# Patient Record
Sex: MALE | Race: BLACK OR AFRICAN AMERICAN | NOT HISPANIC OR LATINO | Employment: OTHER | URBAN - METROPOLITAN AREA
[De-identification: names, ages, dates, MRNs, and addresses within clinical notes are randomized per-mention and may not be internally consistent; named-entity substitution may affect disease eponyms.]

---

## 2020-11-11 DIAGNOSIS — Z12.11 SCREENING FOR COLON CANCER: Primary | ICD-10-CM

## 2020-11-16 ENCOUNTER — TELEPHONE (OUTPATIENT)
Dept: PULMONOLOGY | Facility: CLINIC | Age: 55
End: 2020-11-16

## 2020-12-09 ENCOUNTER — TELEPHONE (OUTPATIENT)
Dept: SURGERY | Facility: CLINIC | Age: 55
End: 2020-12-09

## 2020-12-23 ENCOUNTER — OFFICE VISIT (OUTPATIENT)
Dept: URGENT CARE | Facility: CLINIC | Age: 55
End: 2020-12-23
Payer: MEDICARE

## 2020-12-23 VITALS
DIASTOLIC BLOOD PRESSURE: 100 MMHG | OXYGEN SATURATION: 97 % | HEIGHT: 75 IN | WEIGHT: 300 LBS | HEART RATE: 84 BPM | BODY MASS INDEX: 37.3 KG/M2 | SYSTOLIC BLOOD PRESSURE: 148 MMHG | TEMPERATURE: 98 F

## 2020-12-23 DIAGNOSIS — R03.0 ELEVATED BLOOD PRESSURE READING: Primary | ICD-10-CM

## 2020-12-23 DIAGNOSIS — M10.9 GOUT, UNSPECIFIED CAUSE, UNSPECIFIED CHRONICITY, UNSPECIFIED SITE: ICD-10-CM
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
## 2020-12-23 PROBLEM — S39.012A STRAIN OF LUMBAR REGION: Status: ACTIVE | Noted: 2020-12-23

## 2020-12-23 PROBLEM — R51.9 HEADACHE: Status: ACTIVE | Noted: 2020-12-23

## 2020-12-23 PROBLEM — R42 VERTIGO: Status: ACTIVE | Noted: 2020-12-23

## 2020-12-23 PROBLEM — R00.0 TACHYCARDIA: Status: ACTIVE | Noted: 2020-12-23

## 2020-12-23 PROBLEM — R06.02 SHORT OF BREATH ON EXERTION: Status: ACTIVE | Noted: 2020-12-23

## 2020-12-23 PROBLEM — S16.1XXA STRAIN OF NECK MUSCLE: Status: ACTIVE | Noted: 2020-12-23
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
## 2020-12-23 PROBLEM — E66.9 OBESITY: Status: ACTIVE | Noted: 2020-12-23

## 2020-12-23 PROCEDURE — 99214 OFFICE O/P EST MOD 30 MIN: CPT | Mod: S$GLB,,, | Performed by: NURSE PRACTITIONER

## 2020-12-23 PROCEDURE — 99214 PR OFFICE/OUTPT VISIT, EST, LEVL IV, 30-39 MIN: ICD-10-PCS | Mod: S$GLB,,, | Performed by: NURSE PRACTITIONER

## 2020-12-23 RX ORDER — METHYLPREDNISOLONE 4 MG/1
4 TABLET ORAL
Qty: 1 PACKAGE | Refills: 0 | Status: SHIPPED | OUTPATIENT
Start: 2020-12-23 | End: 2020-12-29

## 2020-12-23 NOTE — PROGRESS NOTES
"Subjective:       Patient ID: Antonio Dodson is a 55 y.o. male.    Vitals:  height is 6' 3" (1.905 m) and weight is 136.1 kg (300 lb). His temperature is 98.3 °F (36.8 °C). His blood pressure is 148/100 (abnormal) and his pulse is 84. His oxygen saturation is 97%.     Chief Complaint: Elbow Pain    Pt c/o right arm pain that began 2 days ago. Pt says he has gout.  Patient took ibuprofen 800mg 4 hours ago.      Elbow Pain  This is a new problem. The current episode started in the past 7 days. Pertinent negatives include no arthralgias, chest pain, chills, congestion, coughing, fatigue, fever, headaches, joint swelling, myalgias, nausea, rash, sore throat, vertigo or vomiting.       Constitution: Negative for chills, fatigue and fever.   HENT: Negative for congestion and sore throat.    Neck: Negative for painful lymph nodes.   Cardiovascular: Negative for chest pain and leg swelling.   Eyes: Negative for double vision and blurred vision.   Respiratory: Negative for cough and shortness of breath.    Gastrointestinal: Negative for nausea, vomiting and diarrhea.   Genitourinary: Negative for dysuria, frequency and urgency.   Musculoskeletal: Positive for pain. Negative for joint pain, joint swelling, muscle cramps and muscle ache.   Skin: Negative for color change, pale and rash.   Allergic/Immunologic: Negative for seasonal allergies.   Neurological: Negative for dizziness, history of vertigo, light-headedness, passing out and headaches.   Hematologic/Lymphatic: Negative for swollen lymph nodes, easy bruising/bleeding and history of blood clots. Does not bruise/bleed easily.   Psychiatric/Behavioral: Negative for nervous/anxious, sleep disturbance and depression. The patient is not nervous/anxious.        Objective:      Physical Exam   Constitutional: He is oriented to person, place, and time. He appears well-developed. He is cooperative. No distress.   HENT:   Head: Normocephalic and atraumatic.   Nose: Nose normal. "   Eyes: Conjunctivae and lids are normal.   Cardiovascular: Normal rate, regular rhythm, normal heart sounds and normal pulses.   Pulmonary/Chest: Effort normal and breath sounds normal.   Musculoskeletal:         General: No deformity.      Right elbow: He exhibits decreased range of motion and swelling. He exhibits no effusion, no deformity and no laceration. Tenderness (TTP, see diagram) found.        Arms:    Neurological: He is alert and oriented to person, place, and time. He has normal strength and normal reflexes. He is not disoriented. No sensory deficit.   Skin: Skin is warm, dry, intact and not diaphoretic. Psychiatric: His speech is normal and behavior is normal. Judgment and thought content normal.   Nursing note and vitals reviewed.        Assessment:       1. Elevated blood pressure reading    2. Gout, unspecified cause, unspecified chronicity, unspecified site        Plan:       Advised to monitor his BP and follow up with PCP.  Verbalized understanding.      Elevated blood pressure reading    Gout, unspecified cause, unspecified chronicity, unspecified site  -     methylPREDNISolone (MEDROL DOSEPACK) 4 mg tablet; Take 1 tablet (4 mg total) by mouth as needed (Take as directed). Take as directed  Dispense: 1 Package; Refill: 0      Patient Instructions   Please follow up with your Primary care provider within 2-5 days if your signs and symptoms have not resolved or worsen.     If your condition worsens or fails to improve we recommend that you receive another evaluation at the emergency room immediately or contact your primary medical clinic to discuss your concerns.    You must understand that you have received an Urgent Care treatment only and that you may be released before all of your medical problems are known or treated.   You, the patient, will arrange for follow up care as instructed.     ORTHO    R.I.C.E. to the affected joint or limb as needed:    Rest- the injured or sore extremity.  Ice-  for the next 24-48 hours, alternating 20 minutes on and 20 minutes off as needed up to 3 times a day.   Compression-Use bandages to stabilize injured limb.  Check circulation to make sure  bandage is not too tight.    Elevate-when possible to reduce swelling.      Ice 20 minutes on and 20 minutes off as needed for pain.     Please drink plenty of fluids.    Please get plenty of rest.    Please return here or go to the Emergency Department for any concerns or worsening of condition.    Please be aware that narcotics can be addictive. If I gave you narcotics, I have given you a limited quantity to take as it is needed at this time. However take it sparingly and only when needed.  Do not operate machinery or drive on this medication.      If you were not prescribed an anti-inflammatory medication, and if you do not have any history of stomach/intestinal ulcers, or kidney disease, or are not taking a blood thinner such as Coumadin, Plavix, Pradaxa, Eloquis, or Xaralta for example, it is OK to take over the counter Ibuprofen or Advil or Motrin or Aleve as directed.  Do not take these medications on an empty stomach.    If you were given a splint wear it at all times unless otherwise instructed.     If you were given crutches use them as we instructed. Do not rest your armpits on the foam pad or you risk compressing the nerves and the vessels there.    Please follow up with your primary care doctor or specialist as needed.    If you lose control of your bowel and/or bladder, please go to the nearest Emergency Department immediately.  If you lose sensation in between your legs by your genitalia and/or rectum, please go to the nearest Emergency Department immediately.  If you lose control or sensation of any extremity, please go to the nearest Emergency Department immediately.      Gout    Gout is an inflammation of a joint due to a build-up of gout crystals in the joint fluid. This occurs when there is an excess of uric  acid (a normal waste product) in the body. Uric acid builds up in the body when the kidneys are unable to filter enough of it from the blood. This may occur with age. It is also associated with kidney disease. Gout occurs more often in people with obesity, diabetes, high blood pressure, or high levels of fats in the blood. It may run in families. Gout tends to come and go. A flare up of gout is called an attack. Drinking alcohol or eating certain foods (such as shellfish or foods with additives such as high-fructose corn syrup) may increase uric acid levels in the blood and cause a gout attack.  During a gout attack, the affected joint may become a hot, red, swollen and painful. If you have had one attack of gout, you are likely to have another. An attack of gout can be treated with medicine. If these attacks become frequent, a daily medicine may be prescribed to help the kidneys remove uric acid from the body.  Home care  During a gout attack:  · Rest painful joints. If gout affects the joints of your foot or leg, you may want to use crutches for the first few days to keep from bearing weight on the affected joint.  · When sitting or lying down, raise the painful joint to a level higher than your heart.  · Apply an ice pack (ice cubes in a plastic bag wrapped in a thin towel) over the injured area for 20 minutes every 1 to 2 hours the first day for pain relief. Continue this 3 to 4 times a day for swelling and pain.  · Avoid alcohol and foods listed below (see Preventing attacks) during a gout attack. Drink extra fluid to help flush the uric acid through your kidneys.  · If you were prescribed a medicine to treat gout, take it as your healthcare provider has instructed. Don't skip doses.  · Take anti-inflammatory medicine as directed.   · If pain medicines have been prescribed, take them exactly as directed.    Preventing attacks  · Minimize or avoid alcohol use. Excess alcohol intake can cause a gout  attack.  · Limit these foods and beverages:  ¨ Organ meats, such as kidneys and liver  ¨ Certain seafoods (anchovies, sardines, shrimp, scallops, herring, mackerel)  ¨ Wild game, meat extracts and meat gravies  ¨ Foods and beverages sweetened with high-fructose corn syrup, such as sodas  · Eat a healthy diet including low-fat and nonfat dairy, whole grains, and vegetables.  · If you are overweight, talk to your healthcare provider about a weight reduction plan. Avoid fasting or extreme low calorie diets (less than 900 calories per day). This will increase uric acid levels in the body.  · If you have diabetes or high blood pressure, work with your doctor to manage these conditions.  · Protect the joint from injury. Trauma can trigger a gout attack.  Follow-up care  Follow up with your healthcare provider, or as advised.   When to seek medical advice  Call your healthcare provider if you have any of the following:  · Fever over 100.4°F (38.ºC) with worsening joint pain  · Increasing redness around the joint  · Pain developing in another joint  · Repeated vomiting, abdominal pain, or blood in the vomit or stool (black or red color)  Date Last Reviewed: 3/1/2017  © 5098-5376 [employer]. [address] [place] [address], [place] [address]. All rights reserved. This information is not intended as a substitute for professional medical care. Always follow your healthcare professional's instructions.

## 2020-12-24 NOTE — PATIENT INSTRUCTIONS
Please follow up with your Primary care provider within 2-5 days if your signs and symptoms have not resolved or worsen.     If your condition worsens or fails to improve we recommend that you receive another evaluation at the emergency room immediately or contact your primary medical clinic to discuss your concerns.    You must understand that you have received an Urgent Care treatment only and that you may be released before all of your medical problems are known or treated.   You, the patient, will arrange for follow up care as instructed.     ORTHO    R.I.C.E. to the affected joint or limb as needed:    Rest- the injured or sore extremity.  Ice- for the next 24-48 hours, alternating 20 minutes on and 20 minutes off as needed up to 3 times a day.   Compression-Use bandages to stabilize injured limb.  Check circulation to make sure  bandage is not too tight.    Elevate-when possible to reduce swelling.      Ice 20 minutes on and 20 minutes off as needed for pain.     Please drink plenty of fluids.    Please get plenty of rest.    Please return here or go to the Emergency Department for any concerns or worsening of condition.    Please be aware that narcotics can be addictive. If I gave you narcotics, I have given you a limited quantity to take as it is needed at this time. However take it sparingly and only when needed.  Do not operate machinery or drive on this medication.      If you were not prescribed an anti-inflammatory medication, and if you do not have any history of stomach/intestinal ulcers, or kidney disease, or are not taking a blood thinner such as Coumadin, Plavix, Pradaxa, Eloquis, or Xaralta for example, it is OK to take over the counter Ibuprofen or Advil or Motrin or Aleve as directed.  Do not take these medications on an empty stomach.    If you were given a splint wear it at all times unless otherwise instructed.     If you were given crutches use them as we instructed. Do not rest your armpits  on the foam pad or you risk compressing the nerves and the vessels there.    Please follow up with your primary care doctor or specialist as needed.    If you lose control of your bowel and/or bladder, please go to the nearest Emergency Department immediately.  If you lose sensation in between your legs by your genitalia and/or rectum, please go to the nearest Emergency Department immediately.  If you lose control or sensation of any extremity, please go to the nearest Emergency Department immediately.      Gout    Gout is an inflammation of a joint due to a build-up of gout crystals in the joint fluid. This occurs when there is an excess of uric acid (a normal waste product) in the body. Uric acid builds up in the body when the kidneys are unable to filter enough of it from the blood. This may occur with age. It is also associated with kidney disease. Gout occurs more often in people with obesity, diabetes, high blood pressure, or high levels of fats in the blood. It may run in families. Gout tends to come and go. A flare up of gout is called an attack. Drinking alcohol or eating certain foods (such as shellfish or foods with additives such as high-fructose corn syrup) may increase uric acid levels in the blood and cause a gout attack.  During a gout attack, the affected joint may become a hot, red, swollen and painful. If you have had one attack of gout, you are likely to have another. An attack of gout can be treated with medicine. If these attacks become frequent, a daily medicine may be prescribed to help the kidneys remove uric acid from the body.  Home care  During a gout attack:  · Rest painful joints. If gout affects the joints of your foot or leg, you may want to use crutches for the first few days to keep from bearing weight on the affected joint.  · When sitting or lying down, raise the painful joint to a level higher than your heart.  · Apply an ice pack (ice cubes in a plastic bag wrapped in a thin  towel) over the injured area for 20 minutes every 1 to 2 hours the first day for pain relief. Continue this 3 to 4 times a day for swelling and pain.  · Avoid alcohol and foods listed below (see Preventing attacks) during a gout attack. Drink extra fluid to help flush the uric acid through your kidneys.  · If you were prescribed a medicine to treat gout, take it as your healthcare provider has instructed. Don't skip doses.  · Take anti-inflammatory medicine as directed.   · If pain medicines have been prescribed, take them exactly as directed.    Preventing attacks  · Minimize or avoid alcohol use. Excess alcohol intake can cause a gout attack.  · Limit these foods and beverages:  ¨ Organ meats, such as kidneys and liver  ¨ Certain seafoods (anchovies, sardines, shrimp, scallops, herring, mackerel)  ¨ Wild game, meat extracts and meat gravies  ¨ Foods and beverages sweetened with high-fructose corn syrup, such as sodas  · Eat a healthy diet including low-fat and nonfat dairy, whole grains, and vegetables.  · If you are overweight, talk to your healthcare provider about a weight reduction plan. Avoid fasting or extreme low calorie diets (less than 900 calories per day). This will increase uric acid levels in the body.  · If you have diabetes or high blood pressure, work with your doctor to manage these conditions.  · Protect the joint from injury. Trauma can trigger a gout attack.  Follow-up care  Follow up with your healthcare provider, or as advised.   When to seek medical advice  Call your healthcare provider if you have any of the following:  · Fever over 100.4°F (38.ºC) with worsening joint pain  · Increasing redness around the joint  · Pain developing in another joint  · Repeated vomiting, abdominal pain, or blood in the vomit or stool (black or red color)  Date Last Reviewed: 3/1/2017  © 5155-6351 [employer]. [address] [place] [address], [place] [address]. All rights reserved. This information is not  intended as a substitute for professional medical care. Always follow your healthcare professional's instructions.

## 2022-04-11 ENCOUNTER — HISTORICAL (OUTPATIENT)
Dept: ADMINISTRATIVE | Facility: HOSPITAL | Age: 57
End: 2022-04-11
Payer: MEDICARE

## 2022-04-28 VITALS
SYSTOLIC BLOOD PRESSURE: 135 MMHG | DIASTOLIC BLOOD PRESSURE: 85 MMHG | HEIGHT: 75 IN | BODY MASS INDEX: 38.3 KG/M2 | WEIGHT: 308 LBS

## 2023-07-11 ENCOUNTER — TELEPHONE (OUTPATIENT)
Dept: UROLOGY | Facility: CLINIC | Age: 58
End: 2023-07-11

## 2023-07-11 ENCOUNTER — OFFICE VISIT (OUTPATIENT)
Dept: UROLOGY | Facility: CLINIC | Age: 58
End: 2023-07-11
Payer: MEDICARE

## 2023-07-11 DIAGNOSIS — N49.2 ABSCESS OF SCROTUM: ICD-10-CM

## 2023-07-11 DIAGNOSIS — N49.2 SCROTAL ABSCESS: Primary | ICD-10-CM

## 2023-07-11 DIAGNOSIS — N49.2 ABSCESS OF SCROTUM: Primary | ICD-10-CM

## 2023-07-11 PROCEDURE — 1159F PR MEDICATION LIST DOCUMENTED IN MEDICAL RECORD: ICD-10-PCS | Mod: CPTII,S$GLB,, | Performed by: UROLOGY

## 2023-07-11 PROCEDURE — 99204 OFFICE O/P NEW MOD 45 MIN: CPT | Mod: S$GLB,,, | Performed by: UROLOGY

## 2023-07-11 PROCEDURE — 1160F RVW MEDS BY RX/DR IN RCRD: CPT | Mod: CPTII,S$GLB,, | Performed by: UROLOGY

## 2023-07-11 PROCEDURE — 99204 PR OFFICE/OUTPT VISIT, NEW, LEVL IV, 45-59 MIN: ICD-10-PCS | Mod: S$GLB,,, | Performed by: UROLOGY

## 2023-07-11 PROCEDURE — 1159F MED LIST DOCD IN RCRD: CPT | Mod: CPTII,S$GLB,, | Performed by: UROLOGY

## 2023-07-11 PROCEDURE — 1160F PR REVIEW ALL MEDS BY PRESCRIBER/CLIN PHARMACIST DOCUMENTED: ICD-10-PCS | Mod: CPTII,S$GLB,, | Performed by: UROLOGY

## 2023-07-11 RX ORDER — TESTOSTERONE CYPIONATE 200 MG/ML
INJECTION, SOLUTION INTRAMUSCULAR
COMMUNITY
Start: 2023-06-13

## 2023-07-11 RX ORDER — OLMESARTAN MEDOXOMIL AND HYDROCHLOROTHIAZIDE 40/25 40; 25 MG/1; MG/1
TABLET ORAL
COMMUNITY
Start: 2023-07-03

## 2023-07-11 RX ORDER — HYDROCODONE BITARTRATE AND ACETAMINOPHEN 10; 325 MG/1; MG/1
1 TABLET ORAL 4 TIMES DAILY PRN
COMMUNITY
Start: 2023-07-10

## 2023-07-11 RX ORDER — SILDENAFIL 50 MG/1
50 TABLET, FILM COATED ORAL
COMMUNITY
Start: 2023-06-08

## 2023-07-11 RX ORDER — CEFDINIR 300 MG/1
300 CAPSULE ORAL 2 TIMES DAILY
Qty: 14 CAPSULE | Refills: 0 | Status: SHIPPED | OUTPATIENT
Start: 2023-07-11 | End: 2023-07-18

## 2023-07-11 NOTE — TELEPHONE ENCOUNTER
Spoke with Marion with Dr. Charles. She states pt has a scrotal abscess that burst and Dr. Charles wants pt to be seen today. I called and spoke with pt and he states the abscess has not burst that it is still very big and has a charcoal color discharge. I have scheduled pt to see Osmani. He is on his way to our office.       ----- Message from Andria Bailey sent at 7/11/2023  9:05 AM CDT -----  Contact: Marion from Dr. Charles office  Marion with Dr. Charles office calling in regards to pt referral. Pt is needing to be seen asap . PT was referred for abscess on scrotum and has now burst . Dr. Charles is insisting that pt be seen today . Please call Marion at [phone]

## 2023-07-11 NOTE — PROGRESS NOTES
Subjective:       Patient ID: Antonio Dodson is a 57 y.o. male.    Chief Complaint: scrotal abscess       HPI:  57-year-old obese  male prediabetic with the 14 day history of a right scrotal infection a over the past 24 hours the area which appears to be an abscess has spontaneously opened and drained her some moderate swelling of the scrotum    Past Medical History: History reviewed. No pertinent past medical history.    Past Surgical Historical:   Past Surgical History:   Procedure Laterality Date    left knee surgery           Medications:   Medication List with Changes/Refills   New Medications    CEFDINIR (OMNICEF) 300 MG CAPSULE    Take 1 capsule (300 mg total) by mouth 2 (two) times daily. for 7 days   Current Medications    HYDROCODONE-ACETAMINOPHEN (NORCO)  MG PER TABLET    Take 1 tablet by mouth 4 (four) times daily as needed.    OLMESARTAN-HYDROCHLOROTHIAZIDE (BENICAR HCT) 40-25 MG PER TABLET        SILDENAFIL (VIAGRA) 50 MG TABLET    Take 50 mg by mouth.    TESTOSTERONE CYPIONATE (DEPOTESTOTERONE CYPIONATE) 200 MG/ML INJECTION            Past Social History:   Social History     Socioeconomic History    Marital status:    Tobacco Use    Smoking status: Never    Smokeless tobacco: Never   Substance and Sexual Activity    Alcohol use: Not Currently    Drug use: Never    Sexual activity: Yes       Allergies:   Review of patient's allergies indicates:   Allergen Reactions    Doxycycline         Family History:   Family History   Problem Relation Age of Onset    No Known Problems Mother     No Known Problems Father         Review of Systems:  Review of Systems   Constitutional:  Negative for activity change and appetite change.   HENT:  Negative for congestion and dental problem.    Eyes:  Negative for visual disturbance.   Respiratory:  Negative for chest tightness and shortness of breath.    Cardiovascular:  Negative for chest pain.   Gastrointestinal:  Negative for abdominal  distention and abdominal pain.   Endocrine: Negative for polyuria.   Genitourinary:  Negative for difficulty urinating, dysuria, flank pain, frequency, hematuria, penile discharge, penile pain, penile swelling, scrotal swelling, testicular pain and urgency.   Musculoskeletal:  Negative for back pain and neck pain.   Skin:  Negative for color change.   Allergic/Immunologic: Positive for immunocompromised state.   Neurological:  Negative for dizziness.   Hematological:  Negative for adenopathy.   Psychiatric/Behavioral:  Negative for agitation, behavioral problems and confusion.      Physical Exam:  Physical Exam  Constitutional:       General: He is not in acute distress.     Appearance: He is well-developed.   HENT:      Head: Normocephalic and atraumatic.      Nose: Nose normal.   Eyes:      General: No scleral icterus.     Conjunctiva/sclera: Conjunctivae normal.      Pupils: Pupils are equal, round, and reactive to light.   Neck:      Thyroid: No thyromegaly.      Trachea: No tracheal deviation.   Cardiovascular:      Rate and Rhythm: Normal rate and regular rhythm.      Heart sounds: Normal heart sounds.   Pulmonary:      Effort: Pulmonary effort is normal. No respiratory distress.      Breath sounds: Normal breath sounds. No wheezing or rales.   Abdominal:      General: Bowel sounds are normal. There is no distension.      Palpations: Abdomen is soft.      Tenderness: There is no abdominal tenderness. There is no guarding or rebound.   Genitourinary:     Penis: Normal. No tenderness.       Prostate: Normal.      Comments: Scrotum has mild scrotal edema there is no more fluctuance in the area of the lesion it is opened draining  Musculoskeletal:         General: No deformity. Normal range of motion.      Cervical back: Neck supple.   Lymphadenopathy:      Cervical: No cervical adenopathy.   Skin:     General: Skin is warm and dry.      Findings: No erythema or rash.   Neurological:      Mental Status: He is  alert and oriented to person, place, and time.      Cranial Nerves: No cranial nerve deficit.   Psychiatric:         Behavior: Behavior normal.       Assessment/Plan:       Problem List Items Addressed This Visit    None  Visit Diagnoses       Scrotal abscess    -  Primary    Relevant Medications    cefdinir (OMNICEF) 300 MG capsule               57-year-old male with a actively draining scrotal abscess:  At this point the abscess is no longer fluctuant and the remainder the scrotum appears healthy and viable with some mild edema recommended Sitz baths 2 to 3 times a day will start him on Omnicef for week if he is not significantly improved in 1 week return to clinic

## 2023-07-14 ENCOUNTER — OUTSIDE PLACE OF SERVICE (OUTPATIENT)
Dept: UROLOGY | Facility: CLINIC | Age: 58
End: 2023-07-14
Payer: MEDICARE

## 2023-07-14 PROCEDURE — 55100 PR DRAINAGE SCROTAL WALL ABSCESS: ICD-10-PCS | Mod: ,,, | Performed by: UROLOGY

## 2023-07-14 PROCEDURE — 55100 DRAINAGE OF SCROTUM ABSCESS: CPT | Mod: ,,, | Performed by: UROLOGY
